# Patient Record
Sex: FEMALE | Race: WHITE | URBAN - METROPOLITAN AREA
[De-identification: names, ages, dates, MRNs, and addresses within clinical notes are randomized per-mention and may not be internally consistent; named-entity substitution may affect disease eponyms.]

---

## 2024-09-25 ENCOUNTER — HOSPITAL ENCOUNTER (EMERGENCY)
Facility: HOSPITAL | Age: 34
Discharge: HOME | End: 2024-09-25
Attending: EMERGENCY MEDICINE
Payer: COMMERCIAL

## 2024-09-25 VITALS
RESPIRATION RATE: 17 BRPM | DIASTOLIC BLOOD PRESSURE: 71 MMHG | HEART RATE: 96 BPM | OXYGEN SATURATION: 100 % | BODY MASS INDEX: 28.82 KG/M2 | SYSTOLIC BLOOD PRESSURE: 117 MMHG | HEIGHT: 65 IN | TEMPERATURE: 98 F | WEIGHT: 173 LBS

## 2024-09-25 DIAGNOSIS — Z3A.28 28 WEEKS GESTATION OF PREGNANCY: ICD-10-CM

## 2024-09-25 DIAGNOSIS — E83.51 HYPOCALCEMIA: ICD-10-CM

## 2024-09-25 DIAGNOSIS — R20.2 PARESTHESIA: ICD-10-CM

## 2024-09-25 DIAGNOSIS — B08.4 HAND, FOOT AND MOUTH DISEASE: Primary | ICD-10-CM

## 2024-09-25 DIAGNOSIS — E88.09 HYPOALBUMINEMIA: ICD-10-CM

## 2024-09-25 DIAGNOSIS — E87.6 HYPOKALEMIA: ICD-10-CM

## 2024-09-25 LAB
ALBUMIN SERPL BCP-MCNC: 2.4 G/DL (ref 3.5–5.2)
ALP SERPL-CCNC: 138 U/L (ref 55–135)
ALT SERPL W/O P-5'-P-CCNC: 46 U/L (ref 10–44)
ANION GAP SERPL CALC-SCNC: 9 MMOL/L (ref 8–16)
APTT PPP: 33.6 SEC (ref 21–32)
AST SERPL-CCNC: 22 U/L (ref 10–40)
BASOPHILS # BLD AUTO: 0.02 K/UL (ref 0–0.2)
BASOPHILS NFR BLD: 0.3 % (ref 0–1.9)
BILIRUB SERPL-MCNC: 0.2 MG/DL (ref 0.1–1)
BUN SERPL-MCNC: 6 MG/DL (ref 6–20)
CALCIUM SERPL-MCNC: 8.5 MG/DL (ref 8.7–10.5)
CHLORIDE SERPL-SCNC: 106 MMOL/L (ref 95–110)
CO2 SERPL-SCNC: 20 MMOL/L (ref 23–29)
CREAT SERPL-MCNC: 0.6 MG/DL (ref 0.5–1.4)
DIFFERENTIAL METHOD BLD: ABNORMAL
EOSINOPHIL # BLD AUTO: 0.1 K/UL (ref 0–0.5)
EOSINOPHIL NFR BLD: 1.1 % (ref 0–8)
ERYTHROCYTE [DISTWIDTH] IN BLOOD BY AUTOMATED COUNT: 12.8 % (ref 11.5–14.5)
EST. GFR  (NO RACE VARIABLE): >60 ML/MIN/1.73 M^2
ESTIMATED AVG GLUCOSE: 97 MG/DL (ref 68–131)
GLUCOSE SERPL-MCNC: 111 MG/DL (ref 70–110)
HBA1C MFR BLD: 5 % (ref 4–5.6)
HCT VFR BLD AUTO: 32.9 % (ref 37–48.5)
HGB BLD-MCNC: 10.3 G/DL (ref 12–16)
IMM GRANULOCYTES # BLD AUTO: 0.05 K/UL (ref 0–0.04)
IMM GRANULOCYTES NFR BLD AUTO: 0.8 % (ref 0–0.5)
INR PPP: 0.9 (ref 0.8–1.2)
LYMPHOCYTES # BLD AUTO: 2 K/UL (ref 1–4.8)
LYMPHOCYTES NFR BLD: 30.8 % (ref 18–48)
MCH RBC QN AUTO: 28.7 PG (ref 27–31)
MCHC RBC AUTO-ENTMCNC: 31.3 G/DL (ref 32–36)
MCV RBC AUTO: 92 FL (ref 82–98)
MONOCYTES # BLD AUTO: 0.6 K/UL (ref 0.3–1)
MONOCYTES NFR BLD: 8.6 % (ref 4–15)
NEUTROPHILS # BLD AUTO: 3.8 K/UL (ref 1.8–7.7)
NEUTROPHILS NFR BLD: 58.4 % (ref 38–73)
NRBC BLD-RTO: 0 /100 WBC
PLATELET # BLD AUTO: 293 K/UL (ref 150–450)
PMV BLD AUTO: 10.1 FL (ref 9.2–12.9)
POTASSIUM SERPL-SCNC: 3.1 MMOL/L (ref 3.5–5.1)
PROT SERPL-MCNC: 6.4 G/DL (ref 6–8.4)
PROTHROMBIN TIME: 9.7 SEC (ref 9–12.5)
RBC # BLD AUTO: 3.59 M/UL (ref 4–5.4)
SODIUM SERPL-SCNC: 135 MMOL/L (ref 136–145)
T3FREE SERPL-MCNC: 2.2 PG/ML (ref 2.3–4.2)
T4 FREE SERPL-MCNC: 0.92 NG/DL (ref 0.71–1.51)
TSH SERPL DL<=0.005 MIU/L-ACNC: 2.17 UIU/ML (ref 0.4–4)
WBC # BLD AUTO: 6.42 K/UL (ref 3.9–12.7)

## 2024-09-25 PROCEDURE — 83036 HEMOGLOBIN GLYCOSYLATED A1C: CPT | Performed by: EMERGENCY MEDICINE

## 2024-09-25 PROCEDURE — 84481 FREE ASSAY (FT-3): CPT | Performed by: EMERGENCY MEDICINE

## 2024-09-25 PROCEDURE — 84443 ASSAY THYROID STIM HORMONE: CPT | Performed by: EMERGENCY MEDICINE

## 2024-09-25 PROCEDURE — 84439 ASSAY OF FREE THYROXINE: CPT | Performed by: EMERGENCY MEDICINE

## 2024-09-25 PROCEDURE — 85730 THROMBOPLASTIN TIME PARTIAL: CPT | Performed by: EMERGENCY MEDICINE

## 2024-09-25 PROCEDURE — 25000003 PHARM REV CODE 250: Performed by: EMERGENCY MEDICINE

## 2024-09-25 PROCEDURE — 80053 COMPREHEN METABOLIC PANEL: CPT | Performed by: EMERGENCY MEDICINE

## 2024-09-25 PROCEDURE — 85610 PROTHROMBIN TIME: CPT | Performed by: EMERGENCY MEDICINE

## 2024-09-25 PROCEDURE — 99284 EMERGENCY DEPT VISIT MOD MDM: CPT

## 2024-09-25 PROCEDURE — 85025 COMPLETE CBC W/AUTO DIFF WBC: CPT | Performed by: EMERGENCY MEDICINE

## 2024-09-25 RX ORDER — CALCIUM CARBONATE 200(500)MG
1000 TABLET,CHEWABLE ORAL
Status: COMPLETED | OUTPATIENT
Start: 2024-09-25 | End: 2024-09-25

## 2024-09-25 RX ADMIN — SODIUM CHLORIDE 500 ML: 9 INJECTION, SOLUTION INTRAVENOUS at 06:09

## 2024-09-25 RX ADMIN — POTASSIUM BICARBONATE 50 MEQ: 978 TABLET, EFFERVESCENT ORAL at 06:09

## 2024-09-25 RX ADMIN — CALCIUM CARBONATE (ANTACID) CHEW TAB 500 MG 1000 MG: 500 CHEW TAB at 06:09

## 2024-09-25 NOTE — ED PROVIDER NOTES
Chief complaint:  URI (States has been an ongoing problem with a virus that her child has passed to her. X 2 ER visits. Lesions and pain to bottom of feet. Pt is 28 weeks gestation.  Wants to be rechecked and have labs repeated due to elevated lfts the last time)      HPI:  Judi Lopez is a 34 y.o. female presenting with painful lesions to her hands and soles that started on Monday.  Her daughter had some form of hand-foot-mouth disease earlier last week.  She also developed fever and sore throat but those have resolved.  She is 28 weeks pregnant.  She had blood work drawn at the outside hospital the other day and she was found to have slightly elevated liver enzymes.  No abdominal pain.  Normal fetal movement.    ROS: As per HPI and below:  Constitutional:  No fevers, no chills  Cardiac: no chest pain  Respiratory: no shortness of breath  Abdominal: no abdominal pain, no nausea, no vomiting  Neuro: no focal numbness, no focal weakness        Review of patient's allergies indicates:  No Known Allergies    No current facility-administered medications on file prior to encounter.     No current outpatient medications on file prior to encounter.       PMH:  As per HPI and below:  History reviewed. No pertinent past medical history.  History reviewed. No pertinent surgical history.    Social History     Socioeconomic History    Marital status:      Social Determinants of Health      Received from [facility]       No family history on file.    Physical Exam:    Vitals:    09/25/24 0504   BP: 123/75   Pulse: 108   Resp: 18   Temp: 97.6 °F (36.4 °C)     Constitutional: Well-nourished, well-developed, in no acute distress, not cachectic  Eyes: PERRLA, EOMI, normal conjunctiva, normal sclera  ENT: Moist Mucous membranes  Respiratory: Clear to auscultation bilaterally, no wheezes, no crackles, no rhonchi  Cardiovascular: Regular rate and rhythm, no murmurs, no rubs, no gallops  Abdominal: Soft,  gravid, nontender, nondistended, no guarding, no rebound  Musculoskeletal: Normal range of motion, no obvious deformity, normal capillary refill, head atraumatic, neck supple, no meningismus  Skin:  Small painful lesions to her hands and palms as well as the underside of her feet  Neurologic: Cranial nerves II through XII intact, no motor deficits, no sensory deficits, no cerebellar deficits  Psychological: Alert, oriented x3, normal affect, normal mood    Orders Placed This Encounter   Procedures    CBC auto differential    Comprehensive metabolic panel    Protime-INR    APTT    Assess fetal heart tones    Insert Saline lock IV       Medications   sodium chloride 0.9% bolus 500 mL 500 mL (500 mLs Intravenous New Bag 9/25/24 0626)   potassium bicarbonate disintegrating tablet 50 mEq (has no administration in time range)   calcium carbonate 200 mg calcium (500 mg) chewable tablet 1,000 mg (has no administration in time range)         Labs Reviewed   CBC W/ AUTO DIFFERENTIAL - Abnormal       Result Value    WBC 6.42      RBC 3.59 (*)     Hemoglobin 10.3 (*)     Hematocrit 32.9 (*)     MCV 92      MCH 28.7      MCHC 31.3 (*)     RDW 12.8      Platelets 293      MPV 10.1      Immature Granulocytes 0.8 (*)     Gran # (ANC) 3.8      Immature Grans (Abs) 0.05 (*)     Lymph # 2.0      Mono # 0.6      Eos # 0.1      Baso # 0.02      nRBC 0      Gran % 58.4      Lymph % 30.8      Mono % 8.6      Eosinophil % 1.1      Basophil % 0.3      Differential Method Automated     COMPREHENSIVE METABOLIC PANEL - Abnormal    Sodium 135 (*)     Potassium 3.1 (*)     Chloride 106      CO2 20 (*)     Glucose 111 (*)     BUN 6      Creatinine 0.6      Calcium 8.5 (*)     Total Protein 6.4      Albumin 2.4 (*)     Total Bilirubin 0.2      Alkaline Phosphatase 138 (*)     AST 22      ALT 46 (*)     eGFR >60.0      Anion Gap 9     APTT - Abnormal    aPTT 33.6 (*)    PROTIME-INR    Prothrombin Time 9.7      INR 0.9         No orders to display        Medical Decision Making  Differential diagnosis includes COVID, flu, rhino virus, hand-foot-mouth disease, life-threatening hepatitis, HELLP syndrome    Patient presents after being 28 weeks pregnant with pains to the palms and soles of her extremities where she has small nodules.  Her daughter had some form of stomatitis earlier in the week and she also developed fever and sore throat and was tested for infection and found to be positive for rhino virus/enterovirus.  She is concerned because her liver enzymes were elevated at that time.  Will recheck her liver enzymes and re-evaluate.    Patient's laboratories show slight hypokalemia and hypocalcemia.  Compared to her laboratories from the 22nd from her hospital in [place], her liver enzymes are improved.  However her albumin level is lower than normal.  I had a long discussion with the patient about her symptoms.  It is possible that some of her symptoms are due to hand-foot-mouth given that her daughter had similar symptoms and that she has these lesions to her hands and feet and she did have recent sore throat.  Additionally, it is possible that secondary to her low albumin and pregnancy that she may be having increased edema to her hands and feet causing neuropathic pain.  I recommended elevation of her extremities and Tylenol.    Amount and/or Complexity of Data Reviewed  Labs: ordered.    Risk  OTC drugs.  Prescription drug management.      Procedures          ASSESSMENT  1. Hand, foot and mouth disease    2. Paresthesia    3. Hypokalemia    4. Hypocalcemia    5. 28 weeks gestation of pregnancy    6. Hypoalbuminemia          Disposition:  Discharged home in stable condition    New Prescriptions    No medications on file     Modified Medications    No medications on file     Discontinued Medications    No medications on file          Isidoro Alcala III, MD  09/25/24 3197

## 2024-09-25 NOTE — ED NOTES
"Pt stated "it feels like I have inflammation in my hands and in my feet because my hands and feet are both hot." Pt also reported that she had a burning sensation in her feet.   "